# Patient Record
Sex: FEMALE | Race: BLACK OR AFRICAN AMERICAN | NOT HISPANIC OR LATINO
[De-identification: names, ages, dates, MRNs, and addresses within clinical notes are randomized per-mention and may not be internally consistent; named-entity substitution may affect disease eponyms.]

---

## 2019-06-14 ENCOUNTER — RESULT REVIEW (OUTPATIENT)
Age: 29
End: 2019-06-14

## 2020-08-23 ENCOUNTER — RESULT REVIEW (OUTPATIENT)
Age: 30
End: 2020-08-23

## 2022-06-03 ENCOUNTER — APPOINTMENT (OUTPATIENT)
Dept: ORTHOPEDIC SURGERY | Facility: CLINIC | Age: 32
End: 2022-06-03

## 2022-06-03 PROBLEM — Z00.00 ENCOUNTER FOR PREVENTIVE HEALTH EXAMINATION: Status: ACTIVE | Noted: 2022-06-03

## 2022-07-15 ENCOUNTER — APPOINTMENT (OUTPATIENT)
Dept: ORTHOPEDIC SURGERY | Facility: CLINIC | Age: 32
End: 2022-07-15

## 2022-07-15 VITALS — WEIGHT: 168 LBS | HEIGHT: 60 IN | BODY MASS INDEX: 32.98 KG/M2

## 2022-07-15 DIAGNOSIS — Z78.9 OTHER SPECIFIED HEALTH STATUS: ICD-10-CM

## 2022-07-15 DIAGNOSIS — M77.12 LATERAL EPICONDYLITIS, LEFT ELBOW: ICD-10-CM

## 2022-07-15 DIAGNOSIS — M77.11 LATERAL EPICONDYLITIS, RIGHT ELBOW: ICD-10-CM

## 2022-07-15 PROCEDURE — 99204 OFFICE O/P NEW MOD 45 MIN: CPT

## 2022-07-15 PROCEDURE — 73080 X-RAY EXAM OF ELBOW: CPT | Mod: 50

## 2022-07-15 RX ORDER — DICLOFENAC SODIUM 75 MG/1
75 TABLET, DELAYED RELEASE ORAL TWICE DAILY
Qty: 60 | Refills: 3 | Status: ACTIVE | COMMUNITY
Start: 2022-07-15 | End: 1900-01-01

## 2022-07-15 RX ORDER — ACETAMINOPHEN AND CODEINE 300; 30 MG/1; MG/1
300-30 TABLET ORAL
Qty: 16 | Refills: 0 | Status: ACTIVE | COMMUNITY
Start: 2022-06-02

## 2022-07-15 RX ORDER — DICLOFENAC SODIUM 1% 10 MG/G
1 GEL TOPICAL TWICE DAILY
Qty: 1 | Refills: 3 | Status: ACTIVE | COMMUNITY
Start: 2022-07-15 | End: 1900-01-01

## 2022-07-15 NOTE — PHYSICAL EXAM
[Bilateral] : elbow bilaterally [NL (150)] : flexion 150 degrees [NL (0)] : extension 0 degrees [NL (90)] : supination 90 degrees [5___] : supination 5[unfilled]/5 [] : no tenderness over medial epicondyle

## 2022-07-15 NOTE — ASSESSMENT
[FreeTextEntry1] : Bilateral Lateral Epicondylitis.\par Tennis Elbow brace.\par HEP demonstrated.\par Ice.\par Diclofenac PO and topical sent.\par Lateral epicondyle injection if not improved.\par RTO prn.\par

## 2022-07-15 NOTE — IMAGING
[Bilateral] : elbow bilaterally [There are no fractures, subluxations or dislocations. No significant abnormalities are seen] : There are no fractures, subluxations or dislocations. No significant abnormalities are seen

## 2022-07-15 NOTE — HISTORY OF PRESENT ILLNESS
[Gradual] : gradual [9] : 9 [0] : 0 [Dull/Aching] : dull/aching [Intermittent] : intermittent [de-identified] : 31 yo RHD Female her for eval of BL elbow pain for 3 months. NO injury. R>L. Pain was gradual and has worsened over time. Denies radiation/ numbness/ tingling. Pain is lateral elbow. Pain worse with lifting/ unscrewing motions. No nighttime sx. 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